# Patient Record
Sex: FEMALE | Race: WHITE
[De-identification: names, ages, dates, MRNs, and addresses within clinical notes are randomized per-mention and may not be internally consistent; named-entity substitution may affect disease eponyms.]

---

## 2018-03-06 ENCOUNTER — HOSPITAL ENCOUNTER (OUTPATIENT)
Dept: HOSPITAL 76 - DI | Age: 63
Discharge: HOME | End: 2018-03-06
Attending: FAMILY MEDICINE
Payer: COMMERCIAL

## 2018-03-06 DIAGNOSIS — Z12.31: Primary | ICD-10-CM

## 2018-03-06 PROCEDURE — 77067 SCR MAMMO BI INCL CAD: CPT

## 2018-03-07 NOTE — MAMMOGRAPHY REPORT
DIGITAL SCREENING MAMMOGRAM:  03/06/2018

 

CLINICAL INDICATION:  A 62-year-old nulliparous patient with history of benign left breast 

biopsy for screening.

 

COMPARISON:  02/16.

 

TECHNIQUE:  Routine CC and MLO projections were obtained of the breasts.

 

FINDINGS:  Parenchymal tissue within the breasts is predominantly fatty replaced.  There are no

dominant masses, suspicious microcalcifications, or secondary signs of malignancy.  In 

comparison to the previous studies, there are no significant changes.

 

IMPRESSION:  NO MAMMOGRAPHIC EVIDENCE OF MALIGNANCY.  NO SIGNIFICANT INTERVAL CHANGES.

 

RECOMMENDATION:  Screening mammography is recommended annually.

 

BIRADS category 1 - negative.

 

STANDARD QUALIFYING STATEMENTS:

1.  This examination was reviewed with the aid of Computed-Aided Detection (CAD).

2.  A negative or benign imaging report should not delay biopsy if clinically suspicious 

findings are present.  Consider surgical consultation if warranted.  More than 5% of cancers 

are not identified by imaging.

3.  Dense breasts may obscure an underlying neoplasm.

 

 

DD: 03/07/2018 12:25

TD: 03/07/2018 14:06

Job #: [id number]

## 2018-12-07 ENCOUNTER — HOSPITAL ENCOUNTER (OUTPATIENT)
Dept: HOSPITAL 76 - DI | Age: 63
Discharge: HOME | End: 2018-12-07
Attending: NURSE PRACTITIONER
Payer: COMMERCIAL

## 2018-12-07 DIAGNOSIS — M25.461: ICD-10-CM

## 2018-12-07 DIAGNOSIS — M17.11: Primary | ICD-10-CM

## 2018-12-09 NOTE — XRAY REPORT
Reason:  PAIN IN RIGHT KNEE

Procedure Date:  12/07/2018   

Accession Number:  [id number] / [id number]                    

Procedure:  XR  - Knee 3 View RT CPT Code:  

 

FULL RESULT:

 

 

EXAM:

RIGHT KNEE RADIOGRAPHY

 

EXAM DATE: 12/7/2018 04:19 PM.

 

CLINICAL HISTORY: PAIN IN RIGHT KNEE.

 

COMPARISON: None.

 

TECHNIQUE: 3 views.

 

FINDINGS:

Bones: Bones are demineralized. No evidence for acute fracture.

 

Joints: No subluxation. Small knee joint effusion. Mild to moderate 

medial compartment femoral tibial osteophytes. Mild to moderate 

patellofemoral osteophytes.

 

Soft Tissues: Normal. No soft tissue swelling.

IMPRESSION:

1. No evidence for acute fracture.

2. Mild to moderate right knee degenerative joint disease as above.

3. Small knee joint effusion.

 

[facility]IA

## 2019-04-25 ENCOUNTER — HOSPITAL ENCOUNTER (OUTPATIENT)
Dept: HOSPITAL 76 - DI | Age: 64
Discharge: HOME | End: 2019-04-25
Attending: NURSE PRACTITIONER
Payer: MEDICARE

## 2019-04-25 DIAGNOSIS — Z12.31: Primary | ICD-10-CM

## 2019-04-25 PROCEDURE — 77063 BREAST TOMOSYNTHESIS BI: CPT

## 2019-04-25 PROCEDURE — 77067 SCR MAMMO BI INCL CAD: CPT

## 2019-04-26 NOTE — MAMMOGRAPHY REPORT
Reason:  ENCOUNTER FOR SCREENING MAMMOGRAM FOR SUPRIYA APARICIO

Procedure Date:  04/25/2019   

Accession Number:  [id number] / [id number]                    

Procedure:  CRISTEL - Screening Mammo w/Abner CPT Code:  

 

FULL RESULT:

 

 

EXAM: Screening Mammo w/Abner

 

DATE: 4/25/2019 3:04 PM

 

CLINICAL HISTORY:  Screening examination. No reported risk factors.

 

TECHNIQUE: (B) - Bilateral  CC and MLO views were obtained.

 

COMPARISON: 3/6/2018 and 2/12/2016.

 

PARENCHYMAL PATTERN: (F) - The breast(s) demonstrate(s) diffuse fatty 

replacement.

 

FINDINGS:

There are no suspicious masses, calcifications, or areas of distortion.

 

IMPRESSION: Negative examination. BI-RADS category 1.

 

RECOMMENDATION: (ANNUAL)  - Recommend routine annual screening 

mammography.

 

BI-RADS CATEGORY: (1) - Negative.

 

STANDARD QUALIFYING STATEMENTS:

1.  This examination was not reviewed with the aid of Computer-Aided 

Detection (CAD).

2.  A negative or benign  imaging report should not preclude biopsy if 

clinically suspicious findings are present.

3.  Dense breasts may obscure an underlying neoplasm.

4. This examination was reviewed with the aid of 3D breast imaging 

(tomosynthesis).

## 2020-07-09 ENCOUNTER — HOSPITAL ENCOUNTER (OUTPATIENT)
Dept: HOSPITAL 76 - DI | Age: 65
Discharge: HOME | End: 2020-07-09
Attending: GENERAL ACUTE CARE HOSPITAL
Payer: MEDICARE

## 2020-07-09 DIAGNOSIS — Z12.31: Primary | ICD-10-CM

## 2020-07-09 PROCEDURE — 77067 SCR MAMMO BI INCL CAD: CPT

## 2020-07-09 PROCEDURE — 77063 BREAST TOMOSYNTHESIS BI: CPT

## 2020-07-10 NOTE — MAMMOGRAPHY REPORT
BILATERAL DIGITAL SCREENING MAMMOGRAM 3D/2D: 7/9/2020

 

CLINICAL: Routine screening. Routine screening.  

 

Comparison is made to exams dated:  4/25/2019 mammogram, 3/6/2018 mammogram - [facility], and 2/12/2016 mammogram - [facility].  The tissue of both breasts is predominantl
y fatty.  

 

No significant masses, calcifications, or other findings are seen in either breast.  

There has been no significant interval change.

 

IMPRESSION: NEGATIVE

There is no mammographic evidence of malignancy. A 1 year screening mammogram is recommended.  

 

 

This exam was interpreted at Station ID: [id number].  

 

NOTE: For mammograms, a report in lay terms will be sent to the patient. Approximately 15% of breast 
malignancies will not be visualized mammographically. In the management of a palpable breast mass, a 
negative mammogram must not discourage biopsy of a clinically suspicious lesion.

 

Electronically Signed By: Ángel Reynolds M.D.          

aty/penrad:7/9/2020 17:46:53  

 

 

 

ACR BI-RADS Category 1: Negative 3341F

PARENCHYMAL PATTERN: (F) - The breast(s) demonstrate(s) diffuse fatty replacement.

BI-RADS CATEGORY: (1) - 1

RECOMMENDATION: (ANNUAL)  - Recommend routine annual screening mammography.

[id number]

1 year screening

LATERALITY: (B)

## 2021-08-05 ENCOUNTER — HOSPITAL ENCOUNTER (OUTPATIENT)
Dept: HOSPITAL 76 - DI | Age: 66
Discharge: HOME | End: 2021-08-05
Attending: NURSE PRACTITIONER
Payer: MEDICARE

## 2021-08-05 DIAGNOSIS — Z12.31: Primary | ICD-10-CM

## 2021-08-06 NOTE — MAMMOGRAPHY REPORT
BILATERAL DIGITAL SCREENING MAMMOGRAM 3D/2D: 8/5/2021

 

CLINICAL: Routine screening.  

 

Comparison is made to exams dated:  7/9/2020 mammogram, 4/25/2019 mammogram, 3/6/2018 mammogram - [facility], and 2/12/2016 mammogram - [facility].  The tissue of both sesar
asts is predominantly fatty.  

 

No significant masses, calcifications, or other findings are seen in either breast.  

There has been no significant interval change.

 

IMPRESSION: NEGATIVE

There is no mammographic evidence of malignancy. A 1 year screening mammogram is recommended.  

 

 

This exam was interpreted at Station ID: [id number].  

 

NOTE: For mammograms, a report in lay terms will be sent to the patient. Approximately 15% of breast 
malignancies will not be visualized mammographically. In the management of a palpable breast mass, a 
negative mammogram must not discourage biopsy of a clinically suspicious lesion.

 

Electronically Signed By: Ángel Reynolds M.D.          

aty/penrad:8/5/2021 14:02:57  

 

 

 

ACR BI-RADS Category 1: Negative 3341F

PARENCHYMAL PATTERN: (F) - The breast(s) demonstrate(s) diffuse fatty replacement.

BI-RADS CATEGORY: (1) - 1

RECOMMENDATION: (ANNUAL)  - Recommend routine annual screening mammography.

[id number]

1 year screening

LATERALITY: (B)

## 2023-08-14 ENCOUNTER — HOSPITAL ENCOUNTER (OUTPATIENT)
Dept: HOSPITAL 76 - DI | Age: 68
Discharge: HOME | End: 2023-08-14
Attending: GENERAL ACUTE CARE HOSPITAL
Payer: MEDICARE

## 2023-08-14 DIAGNOSIS — Z12.31: Primary | ICD-10-CM

## 2023-08-15 NOTE — MAMMOGRAPHY REPORT
BILATERAL DIGITAL SCREENING MAMMOGRAM 3D/2D: 8/14/2023

 

CLINICAL: Routine screening.  

 

Comparison is made to exams dated:  8/8/2022 mammogram, 8/5/2021 mammogram, 7/9/2020 mammogram, 4/25/
2019 mammogram, and 3/6/2018 mammogram - [facility].  

 

Both breasts are almost entirely fatty (category a/<25% glandular tissue).  

 

No significant masses, calcifications, or other findings are seen in either breast.  

There has been no significant interval change.

 

IMPRESSION: NEGATIVE

There is no mammographic evidence of malignancy. A 1 year screening mammogram is recommended.  

 

Based on the Tyrer Cuzick model (a risk assessment model) the patients lifetime risk is 4.2% and her
 10 year risk is 2.4%. According to the ACR, ACS, and NCCN guidelines, an annual breast MRI exam anca
g with mammogram is recommended if the patients lifetime risk is 20% or greater.

 

 

This exam was interpreted at Station ID: [id number].  

 

NOTE: For mammograms, a report in lay terms will be sent to the patient. Approximately 15% of breast 
malignancies will not be visualized mammographically. In the management of a palpable breast mass, a 
negative mammogram must not discourage biopsy of a clinically suspicious lesion.

 

Electronically Signed By: Justin lauren/corbin:8/14/2023 16:51:01  

 

 

letter sent: No_Letter  

ACR BI-RADS Category 1: Negative 3341F

PARENCHYMAL PATTERN: (F) - The breast(s) demonstrate(s) diffuse fatty replacement.

BI-RADS CATEGORY: (1) - 1

Mammogram

[id number]

1 year screening

LATERALITY: (B)

## 2024-08-20 ENCOUNTER — HOSPITAL ENCOUNTER (OUTPATIENT)
Dept: HOSPITAL 76 - DI | Age: 69
Discharge: HOME | End: 2024-08-20
Attending: GENERAL ACUTE CARE HOSPITAL
Payer: MEDICARE

## 2024-08-20 DIAGNOSIS — Z12.31: Primary | ICD-10-CM
